# Patient Record
Sex: MALE | Race: WHITE | URBAN - METROPOLITAN AREA
[De-identification: names, ages, dates, MRNs, and addresses within clinical notes are randomized per-mention and may not be internally consistent; named-entity substitution may affect disease eponyms.]

---

## 2020-03-27 LAB — PSA, EXTERNAL: 0.2

## 2020-07-02 LAB
CREATININE, EXTERNAL: 0.97
HBA1C MFR BLD HPLC: 10.2 %
LDL-C, EXTERNAL: 52

## 2020-09-15 ENCOUNTER — DOCUMENTATION ONLY (OUTPATIENT)
Dept: ENDOCRINOLOGY | Age: 60
End: 2020-09-15

## 2020-10-07 LAB — MICROALBUMIN UR TEST STR-MCNC: 1.2 MG/DL

## 2020-11-25 ENCOUNTER — OFFICE VISIT (OUTPATIENT)
Dept: ENDOCRINOLOGY | Age: 60
End: 2020-11-25
Payer: COMMERCIAL

## 2020-11-25 ENCOUNTER — TELEPHONE (OUTPATIENT)
Dept: ENDOCRINOLOGY | Age: 60
End: 2020-11-25

## 2020-11-25 VITALS
HEART RATE: 70 BPM | WEIGHT: 257.4 LBS | OXYGEN SATURATION: 95 % | HEIGHT: 69 IN | DIASTOLIC BLOOD PRESSURE: 72 MMHG | TEMPERATURE: 96.7 F | BODY MASS INDEX: 38.12 KG/M2 | RESPIRATION RATE: 18 BRPM | SYSTOLIC BLOOD PRESSURE: 132 MMHG

## 2020-11-25 DIAGNOSIS — E11.65 TYPE 2 DIABETES MELLITUS WITH HYPERGLYCEMIA, WITHOUT LONG-TERM CURRENT USE OF INSULIN (HCC): Primary | ICD-10-CM

## 2020-11-25 DIAGNOSIS — G62.9 POLYNEUROPATHY, UNSPECIFIED: ICD-10-CM

## 2020-11-25 DIAGNOSIS — E78.2 MIXED HYPERLIPIDEMIA: ICD-10-CM

## 2020-11-25 DIAGNOSIS — I10 ESSENTIAL HYPERTENSION: ICD-10-CM

## 2020-11-25 PROCEDURE — 99244 OFF/OP CNSLTJ NEW/EST MOD 40: CPT | Performed by: INTERNAL MEDICINE

## 2020-11-25 RX ORDER — SILDENAFIL 50 MG/1
50 TABLET, FILM COATED ORAL AS NEEDED
COMMUNITY

## 2020-11-25 RX ORDER — MELATONIN 2.5MG/10ML
LIQUID (ML) ORAL
COMMUNITY

## 2020-11-25 RX ORDER — GABAPENTIN 600 MG/1
TABLET ORAL
COMMUNITY
Start: 2020-10-07

## 2020-11-25 RX ORDER — FLASH GLUCOSE SCANNING READER
EACH MISCELLANEOUS
COMMUNITY
Start: 2020-10-07 | End: 2022-05-04

## 2020-11-25 RX ORDER — PRAMIPEXOLE 0.38 MG/1
TABLET, EXTENDED RELEASE ORAL
COMMUNITY
Start: 2020-11-11

## 2020-11-25 RX ORDER — LISINOPRIL 2.5 MG/1
TABLET ORAL
COMMUNITY
Start: 2020-11-10

## 2020-11-25 RX ORDER — TRIAMCINOLONE ACETONIDE 1 MG/G
CREAM TOPICAL 2 TIMES DAILY
COMMUNITY

## 2020-11-25 RX ORDER — DULAGLUTIDE 0.75 MG/.5ML
0.75 INJECTION, SOLUTION SUBCUTANEOUS
Qty: 12 SYRINGE | Refills: 3 | Status: SHIPPED | OUTPATIENT
Start: 2020-11-25 | End: 2022-05-04

## 2020-11-25 RX ORDER — FLASH GLUCOSE SENSOR
KIT MISCELLANEOUS
COMMUNITY
Start: 2020-11-16 | End: 2022-05-04

## 2020-11-25 RX ORDER — DULOXETIN HYDROCHLORIDE 60 MG/1
CAPSULE, DELAYED RELEASE ORAL
COMMUNITY
Start: 2020-11-10

## 2020-11-25 RX ORDER — EMPAGLIFLOZIN AND LINAGLIPTIN 10; 5 MG/1; MG/1
TABLET, FILM COATED ORAL
COMMUNITY
Start: 2020-09-21 | End: 2022-05-04 | Stop reason: ALTCHOICE

## 2020-11-25 RX ORDER — MODAFINIL 200 MG/1
TABLET ORAL
COMMUNITY
Start: 2020-09-03

## 2020-11-25 RX ORDER — SIMVASTATIN 20 MG/1
TABLET, FILM COATED ORAL
COMMUNITY
Start: 2020-11-10

## 2020-11-25 RX ORDER — HYDROCHLOROTHIAZIDE 12.5 MG/1
TABLET ORAL
COMMUNITY
Start: 2020-10-05

## 2020-11-25 RX ORDER — METFORMIN HYDROCHLORIDE 1000 MG/1
TABLET ORAL
COMMUNITY
Start: 2020-11-10 | End: 2022-09-02 | Stop reason: SDUPTHER

## 2020-11-25 NOTE — PROGRESS NOTES
Room 1    Identified pt with two pt identifiers(name and ). Reviewed record in preparation for visit and have obtained necessary documentation. All patient medications has been reviewed. Chief Complaint   Patient presents with   [facility]    Diabetes       3 most recent PHQ Screens 2020   Little interest or pleasure in doing things Not at all   Feeling down, depressed, irritable, or hopeless Not at all   Total Score PHQ 2 0     Abuse Screening Questionnaire 2020   Do you ever feel afraid of your partner? N   Are you in a relationship with someone who physically or mentally threatens you? N   Is it safe for you to go home? Y       Health Maintenance Review: Patient reminded of \"due or due soon\" health maintenance. I have asked the patient to contact his/her primary care provider (PCP) for follow-up on his/her health maintenance. Vitals:    20 1556   BP: 132/72   Pulse: 70   Resp: 18   SpO2: 95%   Weight: 257 lb 6.4 oz (116.8 kg)   Height: 5' 9.41\" (1.763 m)   PainSc:   0 - No pain       Wt Readings from Last 3 Encounters:   20 257 lb 6.4 oz (116.8 kg)     Temp Readings from Last 3 Encounters:   No data found for Temp     BP Readings from Last 3 Encounters:   20 132/72     Pulse Readings from Last 3 Encounters:   20 70       No results found for: HBA1C, HGBE8, IMA8NUFR, KDU9GVIO, MCC8JTHA    Coordination of Care Questionnaire:   1) Have you been to an emergency room, urgent care, or hospitalized since your last visit?   no       2. Have seen or consulted any other health care provider since your last visit?  NO    Advance Care Planning:   End of Life Planning: has NO advanced directive - not interested in additional information, has NO advanced directive  - add't info provided, reviewed DNR/DNI and patient is not interested  Abel Rodriguez [address] ACP-Facilitator appointment no    Patient is accompanied by self I have received verbal consent from Alanna Spencer Angel to discuss any/all medical information while they are present in the room.

## 2020-11-25 NOTE — LETTER
11/28/20 Patient: Marianna Saldivar YOB: 1960 Date of Visit: 11/25/2020 Avila Tomlinson NP 
[address] [address] VIA Facsimile: [phone] Dear Avila Tomlinson NP, Thank you for referring Mr. Marianna Saldivar to [address] [place] [address] for evaluation. My notes for this consultation are attached. If you have questions, please do not hesitate to call me. I look forward to following your patient along with you. Sincerely, Shelly Miller MD

## 2020-11-25 NOTE — PROGRESS NOTES
Dedra Bentley MD          Patient Information  Date:11/28/2020  Name : Bonifacio Sánchez 56 y.o.     YOB: 1960         Referred by: Jaydon So NP         Chief Complaint   Patient presents with   [address] [place] [address]    Diabetes       History of Present Illness: Bonifacio Sánchez is a 56 y.o. male here for initial visit of  Diabetes Mellitus. Diabetes mellitus was diagnosed 2010 . End organ effects of diabetes: peripheral neuropathy.     Cardiovascular risk factors: dyslipidemia, diabetes mellitus   Monitoring frequency:2 /day   Last A1C was high and symptoms include  polyuria, polydipsia  Hypoglycemia: no  He is on Glyxambi and Metformin medication consistently  He is a  by profession, he is on light duty due to A1c of more than 8    Weight trend: decreasing steadily  Prior visit with dietician: yes -   Current exercise: no regular exercise    Wt Readings from Last 3 Encounters:   11/25/20 257 lb 6.4 oz (116.8 kg)       BP Readings from Last 3 Encounters:   11/25/20 132/72       No chest pain, shortness of breath, blurred vision  No history of pancreatitis    Past Medical History:   Diagnosis Date    Diabetes mellitus ([facility] [address]     Hyperlipidemia      Current Outpatient Medications   Medication Sig    DULoxetine (CYMBALTA) 60 mg capsule TAKE ONE CAPSULE BY MOUTH EVERY DAY    Glyxambi 10-5 mg tab TAKE ONE TABLET BY MOUTH EVERY MORNING    gabapentin (NEURONTIN) 600 mg tablet TAKE ONE TABLET BY MOUTH TWICE DAILY    hydroCHLOROthiazide (HYDRODIURIL) 12.5 mg tablet take 1 tablet by mouth once daily IN THE MORNING    lisinopriL (PRINIVIL, ZESTRIL) 2.5 mg tablet TAKE ONE TABLET BY MOUTH EVERY DAY    metFORMIN (GLUCOPHAGE) 1,000 mg tablet TAKE ONE TABLET BY MOUTH TWICE DAILY WITH MEALS    Pramipexole 0.375 mg Tb24 take 1 tablet by mouth 2 to 3 hours BEFORE BEDTIME    simvastatin (ZOCOR) 20 mg tablet TAKE ONE TABLET BY MOUTH EVERY EVENING    triamcinolone acetonide (KENALOG) 0.1 % topical cream Apply  to affected area two (2) times a day. use thin layer    sildenafil citrate (VIAGRA) 50 mg tablet Take 50 mg by mouth as needed for Erectile Dysfunction.  Omega-3-DHA-EPA-Fish Oil (Fish Oil) 1,200 (144-216) mg cap Take  by mouth.  FreeStyle Blair 14 Day Elliott misc USE TO check THREE TIMES DAILY AS DIRECTED    FreeStyle Blair 14 Day Sensor kit USE AS DIRECTED    modafiniL (PROVIGIL) 200 mg tablet TAKE 1/2 TABLET BY MOUTH twice daily (no LATER THAN 3pm)    dulaglutide (Trulicity) 0.37 SD/6.8 mL sub-q pen 0.5 mL by SubCUTAneous route every seven (7) days. No current facility-administered medications for this visit. No Known Allergies    Review of Systems:  All 10 systems reviewed and are negative other than mentioned in HPI    Physical Examination:   Blood pressure 132/72, pulse 70, temperature (!) 96.7 °F (35.9 °C), temperature source Oral, resp. rate 18, height 5' 9.41\" (1.763 m), weight 257 lb 6.4 oz (116.8 kg), SpO2 95 %. Estimated body mass index is 37.56 kg/m² as calculated from the following:    Height as of this encounter: 5' 9.41\" (1.763 m). -   Weight as of this encounter: 257 lb 6.4 oz (116.8 kg).   - General: pleasant, no distress, good eye contact  - HEENT: no pallor, no periorbital edema, EOMI  - Neck: supple, no thyromegaly, no nodules  - Cardiovascular: regular,  normal S1 and S2  - Respiratory: clear to auscultation bilaterally  - Gastrointestinal: soft, nontender, nondistended,  BS +  - Musculoskeletal: no proximal muscle weakness in upper or lower extremities  - Integumentary: No edema  - Neurological: alert and oriented  - Psychiatric: normal mood and affect  - Skin: color, texture, turgor normal.           Data Reviewed:       No results found for: HBA1C, RAV4AGAS, HGBE8, GLU, GESTF, GLUCPOC, MCACR, MCA1, MCA2, MCA3, MCAU, LDL, LDLC, DLDLP, HOLLIE, CREAPOC, ACREA, CREA, REFC3, REFC4, CNS6VOFO, VKM6KUEZ Assessment/Plan:     1. Type 2 diabetes mellitus with hyperglycemia, without long-term current use of insulin ([facility] [address]    2. Essential hypertension    3. Polyneuropathy, unspecified    4. Mixed hyperlipidemia        1. Type 2 Diabetes Mellitus   No results found for: HBA1C, HGBE8, KUS7KFZH, DWV6JLDZ, OXE3UNIU  Metformin, Trulicity,: Discussed the side effects  Once he tolerates Trulicity well will discontinue Glyxambi which is a combination of DPP 4 and SGLT2 inhibitors as combination GLP-1 agonist and DPP 4 inhibitor will not help, and switch him to plain Jardiance    Diabetic issues reviewed : glycemic goals , written exchange diet given, low carbohydrate diet, weight control , home glucose monitoring emphasized,  hypoglycemia management and long term diabetic complications discussed. FLU annually ,Pneumovax ,aspirin daily,annual eye exam,microalbumin    2. HTN : Continue current therapy     3. Hyperlipidemia : Will benefit from statin    4. Obesity:Body mass index is 37.56 kg/m². Discussed about the importance of exercise and carbohydrate portion control. Patient Instructions   Once Trulicity is well tolerated then we will switch Glyxambi to Jardiance 25 mg in AM     Follow-up and Dispositions    · Return in about 3 months (around 2/25/2021) for labs before next visit and follow up. Thank you for allowing me to participate in the care of this patient. Christoph Johnson MD      Patient verbalized understanding     Voice-recognition software was used to generate this report, which may result in some phonetic-based errors in the grammar and contents. Even though attempts were made to correct all the mistakes, some may have been missed and remained in the body of the report.

## 2020-11-25 NOTE — TELEPHONE ENCOUNTER
Patient stated if any medications he will be prescribed are scheduled drugs please send to alec villalobos at [facility] as they cannot fill those [facility] right now

## 2020-12-14 ENCOUNTER — TELEPHONE (OUTPATIENT)
Dept: ENDOCRINOLOGY | Age: 60
End: 2020-12-14

## 2020-12-14 NOTE — TELEPHONE ENCOUNTER
Informed pt of message per physician:    Limited graph as he is checking one daily, no evening data. Asked pt to check BG before meals and at bedtime.

## 2021-01-11 LAB
CREATININE, EXTERNAL: 0.78
HBA1C MFR BLD HPLC: 7.8 %
LDL-C, EXTERNAL: 77

## 2021-02-19 PROBLEM — E11.65 TYPE 2 DIABETES MELLITUS WITH HYPERGLYCEMIA, WITHOUT LONG-TERM CURRENT USE OF INSULIN (HCC): Status: ACTIVE | Noted: 2021-02-19

## 2022-03-18 PROBLEM — E11.65 TYPE 2 DIABETES MELLITUS WITH HYPERGLYCEMIA, WITHOUT LONG-TERM CURRENT USE OF INSULIN (HCC): Status: ACTIVE | Noted: 2021-02-19

## 2022-05-04 ENCOUNTER — OFFICE VISIT (OUTPATIENT)
Dept: ENDOCRINOLOGY | Age: 62
End: 2022-05-04
Payer: COMMERCIAL

## 2022-05-04 VITALS
HEIGHT: 69 IN | DIASTOLIC BLOOD PRESSURE: 65 MMHG | SYSTOLIC BLOOD PRESSURE: 114 MMHG | OXYGEN SATURATION: 97 % | WEIGHT: 244 LBS | RESPIRATION RATE: 20 BRPM | TEMPERATURE: 97.7 F | BODY MASS INDEX: 36.14 KG/M2 | HEART RATE: 81 BPM

## 2022-05-04 DIAGNOSIS — I10 ESSENTIAL HYPERTENSION: ICD-10-CM

## 2022-05-04 DIAGNOSIS — E11.65 TYPE 2 DIABETES MELLITUS WITH HYPERGLYCEMIA, WITHOUT LONG-TERM CURRENT USE OF INSULIN (HCC): Primary | ICD-10-CM

## 2022-05-04 DIAGNOSIS — G62.9 POLYNEUROPATHY, UNSPECIFIED: ICD-10-CM

## 2022-05-04 PROCEDURE — 99215 OFFICE O/P EST HI 40 MIN: CPT | Performed by: INTERNAL MEDICINE

## 2022-05-04 RX ORDER — DULAGLUTIDE 1.5 MG/.5ML
1.5 INJECTION, SOLUTION SUBCUTANEOUS
Qty: 12 EACH | Refills: 3 | Status: SHIPPED | OUTPATIENT
Start: 2022-05-04 | End: 2022-09-02 | Stop reason: SDUPTHER

## 2022-05-04 RX ORDER — DULAGLUTIDE 1.5 MG/.5ML
1.5 INJECTION, SOLUTION SUBCUTANEOUS
Qty: 12 EACH | Refills: 3 | Status: SHIPPED | OUTPATIENT
Start: 2022-05-04 | End: 2022-05-04 | Stop reason: SDUPTHER

## 2022-05-04 NOTE — PROGRESS NOTES
Willy Pablo is a 57 y.o. male here for   Chief Complaint   Patient presents with    Diabetes     seen once in 2020       1. Have you been to the ER, urgent care clinic since your last visit? Hospitalized since your last visit? -[facility] Covarrubias in March for COVID    2. Have you seen or consulted any other health care providers outside of the [address] [place] [address] since your last visit? Include any pap smears or colon screening. -PCP and Dr. Liseth Joaquin

## 2022-05-04 NOTE — LETTER
5/4/2022    Patient: Osman Crews   YOB: 1960   Date of Visit: Leandro Gibbons MD  [address] [facility] [address]  Via Fax: [phone]    Dear Otoniel Hester MD,      Thank you for referring Mr. Osman Crews to [address] [place] [address] for evaluation. My notes for this consultation are attached. If you have questions, please do not hesitate to call me. I look forward to following your patient along with you.       Sincerely,    Megan Kasper MD

## 2022-09-02 ENCOUNTER — OFFICE VISIT (OUTPATIENT)
Dept: ENDOCRINOLOGY | Age: 62
End: 2022-09-02
Payer: COMMERCIAL

## 2022-09-02 VITALS
RESPIRATION RATE: 20 BRPM | HEART RATE: 81 BPM | BODY MASS INDEX: 34.96 KG/M2 | WEIGHT: 236 LBS | SYSTOLIC BLOOD PRESSURE: 138 MMHG | TEMPERATURE: 97.8 F | DIASTOLIC BLOOD PRESSURE: 78 MMHG | HEIGHT: 69 IN | OXYGEN SATURATION: 98 %

## 2022-09-02 DIAGNOSIS — I10 ESSENTIAL HYPERTENSION: ICD-10-CM

## 2022-09-02 DIAGNOSIS — E78.2 MIXED HYPERLIPIDEMIA: ICD-10-CM

## 2022-09-02 DIAGNOSIS — E11.65 TYPE 2 DIABETES MELLITUS WITH HYPERGLYCEMIA, WITHOUT LONG-TERM CURRENT USE OF INSULIN (HCC): Primary | ICD-10-CM

## 2022-09-02 PROCEDURE — 99214 OFFICE O/P EST MOD 30 MIN: CPT | Performed by: INTERNAL MEDICINE

## 2022-09-02 RX ORDER — DULAGLUTIDE 1.5 MG/.5ML
1.5 INJECTION, SOLUTION SUBCUTANEOUS
Qty: 12 EACH | Refills: 3 | Status: SHIPPED | OUTPATIENT
Start: 2022-09-02

## 2022-09-02 RX ORDER — PIOGLITAZONEHYDROCHLORIDE 30 MG/1
TABLET ORAL
Qty: 30 TABLET | Refills: 3 | Status: SHIPPED | OUTPATIENT
Start: 2022-09-02

## 2022-09-02 RX ORDER — FLASH GLUCOSE SENSOR
KIT MISCELLANEOUS
Qty: 2 KIT | Refills: 11 | Status: SHIPPED | OUTPATIENT
Start: 2022-09-02

## 2022-09-02 RX ORDER — METFORMIN HYDROCHLORIDE 1000 MG/1
TABLET ORAL
Qty: 180 TABLET | Refills: 4 | Status: SHIPPED | OUTPATIENT
Start: 2022-09-02

## 2022-09-02 RX ORDER — MELOXICAM 15 MG/1
15 TABLET ORAL DAILY
COMMUNITY

## 2022-09-02 RX ORDER — METFORMIN HYDROCHLORIDE 1000 MG/1
TABLET ORAL
Qty: 180 TABLET | Refills: 4 | Status: SHIPPED | OUTPATIENT
Start: 2022-09-02 | End: 2022-09-02 | Stop reason: SDUPTHER

## 2022-09-02 NOTE — PROGRESS NOTES
Omer Shane MD          Patient Information  Date:9/3/2022  Name : Darien Rangel 57 y.o.     YOB: 1960         Referred by: Diana Cordova MD         Chief Complaint   Patient presents with    Diabetes       History of Present Illness: Darien Rangel is a 57 y.o. male here for follow-up of type 2 diabetes mellitus    Diabetes mellitus was diagnosed 2010 . End organ effects of diabetes: peripheral neuropathy. Cardiovascular risk factors: dyslipidemia, diabetes mellitus   He could not get freestyle blair, not checking the blood glucose  Diet is high in carbohydrates,  A1c worsened   No meter or log         Prior hx   He is on Glyxambi and Metformin medication consistently  He is a  by profession, he is on light duty due to A1c of more than 8    Weight trend: decreasing steadily  Prior visit with dietician: yes -   Current exercise: no regular exercise    Wt Readings from Last 3 Encounters:   09/02/22 236 lb (107 kg)   05/04/22 244 lb (110.7 kg)   11/25/20 257 lb 6.4 oz (116.8 kg)       BP Readings from Last 3 Encounters:   09/02/22 138/78   05/04/22 114/65   11/25/20 132/72       No chest pain, shortness of breath, blurred vision  No history of pancreatitis    Past Medical History:   Diagnosis Date    Diabetes mellitus ([address]     Hyperlipidemia      Current Outpatient Medications   Medication Sig    dulaglutide (Trulicity) 1.5 HO/6.4 mL sub-q pen 0.5 mL by SubCUTAneous route every seven (7) days. empagliflozin (Jardiance) 25 mg tablet Take 1 Tablet by mouth every morning. Stop glyxambi    pioglitazone (ACTOS) 30 mg tablet 1 tablet in AM    metFORMIN (GLUCOPHAGE) 1,000 mg tablet TAKE ONE TABLET BY MOUTH TWICE DAILY WITH MEALS    flash glucose sensor (FreeStyle Blair 2 Sensor) kit Use to check BG as directed. Dx code E11.65    meloxicam (MOBIC) 15 mg tablet Take 15 mg by mouth daily.     multivit-min/FA/lycopen/lutein (CENTRUM SILVER ULTRA MEN'S PO) Take 1 Tablet by mouth daily. DULoxetine (CYMBALTA) 60 mg capsule TAKE ONE CAPSULE BY MOUTH EVERY DAY    gabapentin (NEURONTIN) 600 mg tablet TAKE ONE TABLET BY MOUTH TWICE DAILY    hydroCHLOROthiazide (HYDRODIURIL) 12.5 mg tablet take 1 tablet by mouth once daily IN THE MORNING    lisinopriL (PRINIVIL, ZESTRIL) 2.5 mg tablet TAKE ONE TABLET BY MOUTH EVERY DAY    modafiniL (PROVIGIL) 200 mg tablet TAKE 1/2 TABLET BY MOUTH twice daily (no LATER THAN 3pm)    Pramipexole 0.375 mg Tb24 take 1 tablet by mouth 2 to 3 hours BEFORE BEDTIME    simvastatin (ZOCOR) 20 mg tablet TAKE ONE TABLET BY MOUTH EVERY EVENING    triamcinolone acetonide (KENALOG) 0.1 % topical cream Apply  to affected area two (2) times a day. use thin layer    sildenafil citrate (VIAGRA) 50 mg tablet Take 50 mg by mouth as needed for Erectile Dysfunction. Omega-3-DHA-EPA-Fish Oil 1,200 (144-216) mg cap Take  by mouth. No current facility-administered medications for this visit. No Known Allergies    Review of Systems: Per HPI    Physical Examination:   Blood pressure 138/78, pulse 81, temperature 97.8 °F (36.6 °C), temperature source Temporal, resp. rate 20, height 5' 9.41\" (1.763 m), weight 236 lb (107 kg), SpO2 98 %. Estimated body mass index is 34.44 kg/m² as calculated from the following:    Height as of this encounter: 5' 9.41\" (1.763 m). Weight as of this encounter: 236 lb (107 kg).   General: pleasant, no distress, good eye contact  HEENT: no pallor, no periorbital edema, EOMI  Neck: supple, no thyromegaly, no nodules  Cardiovascular: regular,  normal S1 and S2  Respiratory: clear to auscultation bilaterally  Musculoskeletal: no proximal muscle weakness in upper or lower extremities  Integumentary: No edema  Neurological: alert and oriented  Psychiatric: normal mood and affect  Skin: color, texture, turgor normal.     2/22 - creatinine 0.76 A1C 9.4       Data Reviewed:       Lab Results   Component Value Date/Time    Hemoglobin A1c, External 7.8 01/11/2021 12:00 AM    Hemoglobin A1c, External 10.2 07/02/2020 12:00 AM          Assessment/Plan:     1. Type 2 diabetes mellitus with hyperglycemia, without long-term current use of insulin ([facility] [address]    2. Mixed hyperlipidemia    3. Essential hypertension          1. Type 2 Diabetes Mellitus   Lab Results   Component Value Date/Time    Hemoglobin A1c, External 7.8 01/11/2021 12:00 AM   A1C 9.4 2/2022, August 2022 A1c 11  Reviewed labs from PCP  Poorly controlled diabetes mellitus,  Metformin, Trulicity increase the dose,: Discussed the side effects  Jardiance, Actos   Dietary compliance discussed  Diabetic issues reviewed : glycemic goals , written exchange diet given, low carbohydrate diet, weight control , home glucose monitoring emphasized,  hypoglycemia management and long term diabetic complications discussed. FLU annually ,Pneumovax ,aspirin daily,annual eye exam,microalbumin    2. HTN : Continue current therapy     3. Hyperlipidemia : Will benefit from statin    4. Obesity:Body mass index is 34.44 kg/m². Discussed about the importance of exercise and carbohydrate portion control. There are no Patient Instructions on file for this visit. Follow-up and Dispositions    Return in about 4 months (around 1/2/2023) for labs before next visit and follow up. Thank you for allowing me to participate in the care of this patient. Ham Lock MD      Patient verbalized understanding     Voice-recognition software was used to generate this report, which may result in some phonetic-based errors in the grammar and contents. Even though attempts were made to correct all the mistakes, some may have been missed and remained in the body of the report.

## 2022-09-02 NOTE — PROGRESS NOTES
Emily Glover is a 57 y.o. male here for   Chief Complaint   Patient presents with    Diabetes       1. Have you been to the ER, urgent care clinic since your last visit? Hospitalized since your last visit? -8/10/22 [facility] for GI blockage    2. Have you seen or consulted any other health care providers outside of the [address] [place] [address] since your last visit? Include any pap smears or colon screening. -PCP

## 2022-09-02 NOTE — LETTER
9/3/2022    Patient: Marino Ragsdale   YOB: 1960   Date of Visit: 9/2/2022     Madi Goff MD  [address] [facility] [address]  Via Fax: [phone]    Dear Madi Goff MD,      Thank you for referring Mr. Marino Ragsdale to [address] [place] [address] for evaluation. My notes for this consultation are attached. If you have questions, please do not hesitate to call me. I look forward to following your patient along with you.       Sincerely,    Alona Toney MD

## 2022-11-03 ENCOUNTER — TRANSCRIBE ORDER (OUTPATIENT)
Dept: SCHEDULING | Age: 62
End: 2022-11-03

## 2022-11-03 DIAGNOSIS — K26.7 CHRONIC DUODENAL ULCER WITHOUT HEMORRHAGE OR PERFORATION: Primary | ICD-10-CM

## 2022-11-03 DIAGNOSIS — R16.1 SPLENOMEGALY: ICD-10-CM

## 2022-11-03 DIAGNOSIS — K31.5 DUODENAL OBSTRUCTION: ICD-10-CM

## 2022-11-03 DIAGNOSIS — R10.11 ABDOMINAL PAIN, RIGHT UPPER QUADRANT: ICD-10-CM

## 2022-11-21 ENCOUNTER — TRANSCRIBE ORDER (OUTPATIENT)
Dept: SCHEDULING | Age: 62
End: 2022-11-21

## 2022-11-21 DIAGNOSIS — K57.92 DIVERTICULITIS: ICD-10-CM

## 2022-11-21 DIAGNOSIS — K63.5 POLYP OF COLON: ICD-10-CM

## 2022-11-21 DIAGNOSIS — R19.4 CHANGE IN BOWEL HABIT: Primary | ICD-10-CM

## 2022-11-21 DIAGNOSIS — R16.1 SPLENOMEGALY: ICD-10-CM

## 2022-11-21 DIAGNOSIS — R10.10 UPPER ABDOMINAL PAIN: ICD-10-CM

## 2022-11-21 DIAGNOSIS — K76.0 NON-ALCOHOLIC FATTY LIVER DISEASE: ICD-10-CM

## 2022-11-21 DIAGNOSIS — E11.9 TYPE 2 DIABETES MELLITUS WITHOUT COMPLICATIONS (HCC): ICD-10-CM

## 2022-11-21 DIAGNOSIS — E66.9 OBESITY: ICD-10-CM

## 2022-11-21 DIAGNOSIS — R19.7 DIARRHEA: ICD-10-CM

## 2022-11-21 DIAGNOSIS — K59.00 CONSTIPATION, UNSPECIFIED: ICD-10-CM

## 2022-11-21 DIAGNOSIS — R10.13 DYSPEPSIA: ICD-10-CM

## 2022-11-21 DIAGNOSIS — K31.5 DUODENAL OBSTRUCTION: ICD-10-CM

## 2022-11-21 DIAGNOSIS — K59.00 CONSTIPATION: ICD-10-CM

## 2022-11-21 DIAGNOSIS — K26.7 CHRONIC DUODENAL ULCER WITHOUT HEMORRHAGE OR PERFORATION: ICD-10-CM

## 2022-11-21 DIAGNOSIS — K31.89 OTHER DISEASES OF STOMACH AND DUODENUM: ICD-10-CM

## 2022-11-21 DIAGNOSIS — K29.00 GASTRITIS, ACUTE: ICD-10-CM

## 2023-02-15 LAB — CREATININE, EXTERNAL: 0.99

## 2023-03-28 LAB — CREATININE, EXTERNAL: 1.01

## 2023-05-01 ENCOUNTER — OFFICE VISIT (OUTPATIENT)
Dept: ENDOCRINOLOGY | Age: 63
End: 2023-05-01
Payer: COMMERCIAL

## 2023-05-01 VITALS
HEIGHT: 69 IN | RESPIRATION RATE: 18 BRPM | HEART RATE: 59 BPM | DIASTOLIC BLOOD PRESSURE: 73 MMHG | TEMPERATURE: 97.8 F | BODY MASS INDEX: 33.12 KG/M2 | WEIGHT: 223.6 LBS | SYSTOLIC BLOOD PRESSURE: 128 MMHG | OXYGEN SATURATION: 97 %

## 2023-05-01 DIAGNOSIS — I10 ESSENTIAL HYPERTENSION: ICD-10-CM

## 2023-05-01 DIAGNOSIS — E78.2 MIXED HYPERLIPIDEMIA: ICD-10-CM

## 2023-05-01 DIAGNOSIS — E11.65 TYPE 2 DIABETES MELLITUS WITH HYPERGLYCEMIA, WITHOUT LONG-TERM CURRENT USE OF INSULIN (HCC): Primary | ICD-10-CM

## 2023-05-01 PROCEDURE — 3074F SYST BP LT 130 MM HG: CPT | Performed by: INTERNAL MEDICINE

## 2023-05-01 PROCEDURE — 1123F ACP DISCUSS/DSCN MKR DOCD: CPT | Performed by: INTERNAL MEDICINE

## 2023-05-01 PROCEDURE — 99214 OFFICE O/P EST MOD 30 MIN: CPT | Performed by: INTERNAL MEDICINE

## 2023-05-01 PROCEDURE — 3078F DIAST BP <80 MM HG: CPT | Performed by: INTERNAL MEDICINE

## 2023-05-01 RX ORDER — PANTOPRAZOLE SODIUM 40 MG/1
40 TABLET, DELAYED RELEASE ORAL DAILY
COMMUNITY

## 2023-05-01 RX ORDER — ATORVASTATIN CALCIUM 40 MG/1
TABLET, FILM COATED ORAL DAILY
COMMUNITY

## 2023-05-01 RX ORDER — DULAGLUTIDE 3 MG/.5ML
3 INJECTION, SOLUTION SUBCUTANEOUS
Qty: 12 EACH | Refills: 3 | Status: SHIPPED | OUTPATIENT
Start: 2023-05-01

## 2023-05-01 NOTE — PROGRESS NOTES
Vandana Perez MD          Patient Information  Date:5/1/2023  Name : Laurey Mcardle 70 y.o.     YOB: 1960         Referred by: Adi Hart MD     Chief Complaint   Patient presents with    Diabetes     History of Present Illness: Laurey Mcardle is a 70 y.o. male here for follow-up of type 2 diabetes mellitus. [birth year]  Trulicity - tolerating  Urine - yellow  Having dry mouth  Not on Actos  Eye exam - up to date, no issues  Had ERCP for cholelithiasis, when he presented with abdominal pain, was found to have duodenal ulcer and cholelithiasis, followed by GI,  Having consultation for possible cystectomy    Prior history    Diabetes mellitus was diagnosed 2010 . End organ effects of diabetes: peripheral neuropathy. Cardiovascular risk factors: dyslipidemia, diabetes mellitus   He could not get freestyle baltazar, not checking the blood glucose  Diet is high in carbohydrates,  A1c worsened  No meter or log     Prior hx   He is on Glyxambi and Metformin medication consistently  He is a  by profession, he is on light duty due to A1c of more than 8    Weight trend: decreasing steadily  Prior visit with dietician: yes -   Current exercise: no regular exercise    Wt Readings from Last 3 Encounters:   05/01/23 223 lb 9.6 oz (101.4 kg)   09/02/22 236 lb (107 kg)   05/04/22 244 lb (110.7 kg)       BP Readings from Last 3 Encounters:   05/01/23 128/73   09/02/22 138/78   05/04/22 114/65       No chest pain, shortness of breath, blurred vision  No history of pancreatitis    Past Medical History:   Diagnosis Date    Diabetes mellitus ([facility] [address]     Hyperlipidemia      Current Outpatient Medications   Medication Sig    atorvastatin (LIPITOR) 40 mg tablet Take  by mouth daily. pantoprazole (PROTONIX) 40 mg tablet Take 1 Tablet by mouth daily. dulaglutide (Trulicity) 3 SB/2.6 mL pnij 3 mg by SubCUTAneous route every seven (7) days.  Stop 1.5 mg empagliflozin (Jardiance) 25 mg tablet Take 1 Tablet by mouth every morning. Stop glyxambi    metFORMIN (GLUCOPHAGE) 1,000 mg tablet TAKE ONE TABLET BY MOUTH TWICE DAILY WITH MEALS    multivit-min/FA/lycopen/lutein (CENTRUM SILVER ULTRA MEN'S PO) Take 1 Tablet by mouth daily. DULoxetine (CYMBALTA) 60 mg capsule TAKE ONE CAPSULE BY MOUTH EVERY DAY    gabapentin (NEURONTIN) 600 mg tablet TAKE ONE TABLET BY MOUTH TWICE DAILY    hydroCHLOROthiazide (HYDRODIURIL) 12.5 mg tablet take 1 tablet by mouth once daily IN THE MORNING    lisinopriL (PRINIVIL, ZESTRIL) 2.5 mg tablet TAKE ONE TABLET BY MOUTH EVERY DAY    Pramipexole 0.375 mg Tb24 take 1 tablet by mouth 2 to 3 hours BEFORE BEDTIME    Omega-3-DHA-EPA-Fish Oil 1,200 (144-216) mg cap Take  by mouth.    pioglitazone (ACTOS) 30 mg tablet 1 tablet in AM (Patient not taking: Reported on 5/1/2023)    flash glucose sensor (FreeStyle Blair 2 Sensor) kit Use to check BG as directed. Dx code E11.65 (Patient not taking: Reported on 5/1/2023)    meloxicam (MOBIC) 15 mg tablet Take 15 mg by mouth daily. (Patient not taking: Reported on 5/1/2023)    modafiniL (PROVIGIL) 200 mg tablet TAKE 1/2 TABLET BY MOUTH twice daily (no LATER THAN 3pm) (Patient not taking: Reported on 5/1/2023)    simvastatin (ZOCOR) 20 mg tablet TAKE ONE TABLET BY MOUTH EVERY EVENING (Patient not taking: Reported on 5/1/2023)    triamcinolone acetonide (KENALOG) 0.1 % topical cream Apply  to affected area two (2) times a day. use thin layer (Patient not taking: Reported on 5/1/2023)    sildenafil citrate (VIAGRA) 50 mg tablet Take 50 mg by mouth as needed for Erectile Dysfunction. (Patient not taking: Reported on 5/1/2023)     No current facility-administered medications for this visit. No Known Allergies    Review of Systems: Per HPI    Physical Examination:   Blood pressure 128/73, pulse (!) 59, temperature 97.8 °F (36.6 °C), temperature source Temporal, resp.  rate 18, height 5' 9\" (1.753 m), weight 223 lb 9.6 oz (101.4 kg), SpO2 97 %. Estimated body mass index is 33.02 kg/m² as calculated from the following:    Height as of this encounter: 5' 9\" (1.753 m). Weight as of this encounter: 223 lb 9.6 oz (101.4 kg). General: pleasant, no distress, good eye contact  HEENT: no pallor, no periorbital edema, EOMI  Neck: supple, no thyromegaly, no nodules  Cardiovascular: regular,  normal S1 and S2  Respiratory: clear to auscultation bilaterally  Musculoskeletal: no proximal muscle weakness in upper or lower extremities  Integumentary: No edema  Neurological: alert and oriented  Psychiatric: normal mood and affect  Skin: color, texture, turgor normal.     2/22 - creatinine 0.76 A1C 9.4       Data Reviewed:       Lab Results   Component Value Date/Time    Hemoglobin A1c, External 10.9 08/19/2022 12:00 AM    Hemoglobin A1c, External 7.8 01/11/2021 12:00 AM    Hemoglobin A1c, External 10.2 07/02/2020 12:00 AM          Assessment/Plan:     1. Type 2 diabetes mellitus with hyperglycemia, without long-term current use of insulin ([facility] [address]    2. Mixed hyperlipidemia    3. Essential hypertension            1. Type 2 Diabetes Mellitus   Lab Results   Component Value Date/Time    Hemoglobin A1c, External 10.9 08/19/2022 12:00 AM     Reviewed labs from hematology, A1c February 2023 7.2, April 2023 hemoglobin 14  Improved control  Metformin, Trulicity increase the dose,: Discussed the side effects  Jardiance, he has discontinued Actos   Dietary compliance discussed  Diabetic issues reviewed : glycemic goals , written exchange diet given, low carbohydrate diet, weight control , home glucose monitoring emphasized,  hypoglycemia management and long term diabetic complications discussed. FLU annually ,Pneumovax ,aspirin daily,annual eye exam,microalbumin    2. HTN : Continue current therapy     3. Hyperlipidemia : Will benefit from statin    4. Obesity:Body mass index is 33.02 kg/m².   Discussed about the importance of exercise and carbohydrate portion control. 5.  Cholelithiasis/duodenal ulcer diagnosed in 2023    There are no Patient Instructions on file for this visit. Follow-up and Dispositions    Return in about 4 months (around 9/1/2023) for labs before next visit and follow up. Thank you for allowing me to participate in the care of this patient. Nanette Hsu MD      Patient verbalized understanding     Voice-recognition software was used to generate this report, which may result in some phonetic-based errors in the grammar and contents. Even though attempts were made to correct all the mistakes, some may have been missed and remained in the body of the report.

## 2023-05-01 NOTE — PROGRESS NOTES
Chucho Rubio is a 70 y.o. male here for   Chief Complaint   Patient presents with    Diabetes       1. Have you been to the ER or an urgent care clinic since your last visit?  - no    2. Have you been hospitalized since your last visit? - stent placements in Gallbladder - Everardo Sanchez; Kidney stone removal - Everardo Sanchez     3. Have you seen or consulted any other health care providers outside of the [address] [place] [address] since your last visit?   Include any pap smears or colon screening.- Outpatient - Endoscopy and Colonoscopy (separate occassions) - [facility]

## 2023-05-08 DIAGNOSIS — E11.65 TYPE 2 DIABETES MELLITUS WITH HYPERGLYCEMIA, WITHOUT LONG-TERM CURRENT USE OF INSULIN (HCC): Primary | ICD-10-CM

## 2023-05-08 DIAGNOSIS — E78.2 MIXED HYPERLIPIDEMIA: ICD-10-CM

## 2023-10-04 RX ORDER — EMPAGLIFLOZIN 25 MG/1
TABLET, FILM COATED ORAL
Qty: 90 TABLET | Refills: 0 | Status: SHIPPED | OUTPATIENT
Start: 2023-10-04

## 2023-10-25 DIAGNOSIS — E11.65 TYPE 2 DIABETES MELLITUS WITH HYPERGLYCEMIA, WITHOUT LONG-TERM CURRENT USE OF INSULIN (HCC): Primary | ICD-10-CM

## 2024-01-03 DIAGNOSIS — E11.65 TYPE 2 DIABETES MELLITUS WITH HYPERGLYCEMIA, WITHOUT LONG-TERM CURRENT USE OF INSULIN (HCC): Primary | ICD-10-CM

## 2024-03-28 DIAGNOSIS — E11.65 TYPE 2 DIABETES MELLITUS WITH HYPERGLYCEMIA, WITHOUT LONG-TERM CURRENT USE OF INSULIN (HCC): Primary | ICD-10-CM

## 2024-03-29 RX ORDER — DULAGLUTIDE 1.5 MG/.5ML
1.5 INJECTION, SOLUTION SUBCUTANEOUS
Qty: 6 ML | Refills: 0 | Status: SHIPPED | OUTPATIENT
Start: 2024-03-29

## 2024-04-16 DIAGNOSIS — E11.65 TYPE 2 DIABETES MELLITUS WITH HYPERGLYCEMIA, WITHOUT LONG-TERM CURRENT USE OF INSULIN (HCC): Primary | ICD-10-CM

## 2024-04-27 DIAGNOSIS — E11.65 TYPE 2 DIABETES MELLITUS WITH HYPERGLYCEMIA, WITHOUT LONG-TERM CURRENT USE OF INSULIN (HCC): ICD-10-CM

## 2024-06-14 ENCOUNTER — OFFICE VISIT (OUTPATIENT)
Age: 64
End: 2024-06-14
Payer: COMMERCIAL

## 2024-06-14 VITALS
SYSTOLIC BLOOD PRESSURE: 114 MMHG | HEIGHT: 69 IN | TEMPERATURE: 97.7 F | BODY MASS INDEX: 32.94 KG/M2 | HEART RATE: 61 BPM | OXYGEN SATURATION: 98 % | DIASTOLIC BLOOD PRESSURE: 74 MMHG | WEIGHT: 222.4 LBS

## 2024-06-14 DIAGNOSIS — E78.2 MIXED HYPERLIPIDEMIA: ICD-10-CM

## 2024-06-14 DIAGNOSIS — E11.65 TYPE 2 DIABETES MELLITUS WITH HYPERGLYCEMIA, WITHOUT LONG-TERM CURRENT USE OF INSULIN (HCC): Primary | ICD-10-CM

## 2024-06-14 PROCEDURE — 99214 OFFICE O/P EST MOD 30 MIN: CPT | Performed by: INTERNAL MEDICINE

## 2024-06-14 PROCEDURE — 1123F ACP DISCUSS/DSCN MKR DOCD: CPT | Performed by: INTERNAL MEDICINE

## 2024-06-14 RX ORDER — BLOOD-GLUCOSE SENSOR
EACH MISCELLANEOUS
Qty: 6 EACH | Refills: 3 | Status: SHIPPED | OUTPATIENT
Start: 2024-06-14

## 2024-06-14 RX ORDER — PIOGLITAZONEHYDROCHLORIDE 30 MG/1
30 TABLET ORAL DAILY
Qty: 30 TABLET | Refills: 3 | Status: SHIPPED | OUTPATIENT
Start: 2024-06-14

## 2024-06-14 RX ORDER — AMOXICILLIN AND CLAVULANATE POTASSIUM 875; 125 MG/1; MG/1
1 TABLET, FILM COATED ORAL 2 TIMES DAILY
COMMUNITY
Start: 2024-06-02

## 2024-06-14 RX ORDER — DULAGLUTIDE 1.5 MG/.5ML
1.5 INJECTION, SOLUTION SUBCUTANEOUS
Qty: 6 ML | Refills: 2 | Status: SHIPPED | OUTPATIENT
Start: 2024-06-14

## 2024-06-14 NOTE — PATIENT INSTRUCTIONS
Metformin     Jardiance AM     Pioglitazone AM     Trulicity ( not an insulin  )  weekly   or  insulin Lantus 15 units at night     Blood glucose goals    Fasting or before meals less than 120,  2 hours after meals or at bedtime less than 180.If the bedtime sugars are less than 100 ,eat a 15 gm snack.    Low blood glucose is less than 70, if you are using continuous glucose monitor please crosscheck with fingerstick as continuous glucose monitor is not accurate when glucose is less than 70.    4 glucose tablets or half a cup of juice for sugars <70. Check blood sugar 15 minutes later, may repeat juice or tablets in 15 minutes.     Glucagon use by family member for severe hypoglycemia (unconciousness)

## 2024-06-14 NOTE — PROGRESS NOTES
Reviewed:           No results found for: \"GFR\", \"GFRAA\"     No results found for: \"LABA1C\"      No results found for: \"TRIG\", \"HDL\", \"NA\", \"K\", \"CL\", \"CO2\", \"BUN\", \"CREATININE\", \"GFRAA\", \"LABGLOM\", \"GLUCOSE\", \"CALCIUM\", \"MALBCR\"       August 2023, BMP normal              Assessment/Plan:                 1. Type 2 Diabetes Mellitus      Follow-up after a year, labs today   Metformin, Trulicity:   Jardiance,Actos    Dietary compliance discussed   Diabetic issues reviewed : glycemic goals , written exchange diet given, low carbohydrate diet, weight control , home glucose monitoring emphasized,  hypoglycemia management and long term diabetic complications discussed.    FLU annually ,Pneumovax ,aspirin daily,annual eye exam,microalbumin      2.  HTN : Continue current  therapy       3. Hyperlipidemia : Will benefit from statin      4.Obesity:   Discussed about the importance of exercise and carbohydrate portion control.      5.  Cholelithiasis/duodenal ulcer diagnosed in 2023      There are no Patient Instructions on file for this visit.        Patient Instructions   Metformin     Jardiance AM     Pioglitazone AM     Trulicity ( not an insulin  )  weekly   or  insulin Lantus 15 units at night     Blood glucose goals    Fasting or before meals less than 120,  2 hours after meals or at bedtime less than 180.If the bedtime sugars are less than 100 ,eat a 15 gm snack.    Low blood glucose is less than 70, if you are using continuous glucose monitor please crosscheck with fingerstick as continuous glucose monitor is not accurate when glucose is less than 70.    4 glucose tablets or half a cup of juice for sugars <70. Check blood sugar 15 minutes later, may repeat juice or tablets in 15 minutes.     Glucagon use by family member for severe hypoglycemia (unconciousness)              Return in about 2 months (around 8/14/2024).       Thank you for allowing me to participate in the care of this patient.      Le Bahena MD

## 2024-06-15 LAB
ANION GAP SERPL CALC-SCNC: 8 MMOL/L (ref 5–15)
BUN SERPL-MCNC: 11 MG/DL (ref 6–20)
BUN/CREAT SERPL: 11 (ref 12–20)
CALCIUM SERPL-MCNC: 10.1 MG/DL (ref 8.5–10.1)
CHLORIDE SERPL-SCNC: 104 MMOL/L (ref 97–108)
CO2 SERPL-SCNC: 24 MMOL/L (ref 21–32)
CREAT SERPL-MCNC: 0.99 MG/DL (ref 0.7–1.3)
EST. AVERAGE GLUCOSE BLD GHB EST-MCNC: 266 MG/DL
GLUCOSE SERPL-MCNC: 169 MG/DL (ref 65–100)
HBA1C MFR BLD: 10.9 % (ref 4–5.6)
POTASSIUM SERPL-SCNC: 3.8 MMOL/L (ref 3.5–5.1)
SODIUM SERPL-SCNC: 136 MMOL/L (ref 136–145)

## 2024-07-29 DIAGNOSIS — E11.65 TYPE 2 DIABETES MELLITUS WITH HYPERGLYCEMIA, WITHOUT LONG-TERM CURRENT USE OF INSULIN (HCC): ICD-10-CM

## 2024-08-30 DIAGNOSIS — E11.65 TYPE 2 DIABETES MELLITUS WITH HYPERGLYCEMIA, WITHOUT LONG-TERM CURRENT USE OF INSULIN (HCC): ICD-10-CM

## 2024-08-30 RX ORDER — DULAGLUTIDE 1.5 MG/.5ML
1.5 INJECTION, SOLUTION SUBCUTANEOUS
Qty: 6 ML | Refills: 2 | Status: SHIPPED | OUTPATIENT
Start: 2024-08-30

## 2024-09-03 ENCOUNTER — OFFICE VISIT (OUTPATIENT)
Age: 64
End: 2024-09-03
Payer: COMMERCIAL

## 2024-09-03 VITALS
DIASTOLIC BLOOD PRESSURE: 68 MMHG | OXYGEN SATURATION: 98 % | SYSTOLIC BLOOD PRESSURE: 118 MMHG | TEMPERATURE: 97.7 F | HEART RATE: 65 BPM | HEIGHT: 69 IN | BODY MASS INDEX: 34.07 KG/M2 | RESPIRATION RATE: 16 BRPM | WEIGHT: 230 LBS

## 2024-09-03 DIAGNOSIS — E11.65 TYPE 2 DIABETES MELLITUS WITH HYPERGLYCEMIA, WITHOUT LONG-TERM CURRENT USE OF INSULIN (HCC): ICD-10-CM

## 2024-09-03 DIAGNOSIS — E78.2 MIXED HYPERLIPIDEMIA: Primary | ICD-10-CM

## 2024-09-03 DIAGNOSIS — I10 ESSENTIAL (PRIMARY) HYPERTENSION: ICD-10-CM

## 2024-09-03 PROCEDURE — 3074F SYST BP LT 130 MM HG: CPT | Performed by: INTERNAL MEDICINE

## 2024-09-03 PROCEDURE — 3046F HEMOGLOBIN A1C LEVEL >9.0%: CPT | Performed by: INTERNAL MEDICINE

## 2024-09-03 PROCEDURE — 95251 CONT GLUC MNTR ANALYSIS I&R: CPT | Performed by: INTERNAL MEDICINE

## 2024-09-03 PROCEDURE — 1123F ACP DISCUSS/DSCN MKR DOCD: CPT | Performed by: INTERNAL MEDICINE

## 2024-09-03 PROCEDURE — 99214 OFFICE O/P EST MOD 30 MIN: CPT | Performed by: INTERNAL MEDICINE

## 2024-09-03 PROCEDURE — 3078F DIAST BP <80 MM HG: CPT | Performed by: INTERNAL MEDICINE

## 2024-09-03 RX ORDER — ELECTROLYTES/DEXTROSE
SOLUTION, ORAL ORAL
COMMUNITY

## 2024-09-03 RX ORDER — PEN NEEDLE, DIABETIC 30 GX3/16"
NEEDLE, DISPOSABLE MISCELLANEOUS
Qty: 100 EACH | Refills: 3 | Status: SHIPPED | OUTPATIENT
Start: 2024-09-03

## 2024-09-03 RX ORDER — DULAGLUTIDE 3 MG/.5ML
INJECTION, SOLUTION SUBCUTANEOUS
Qty: 6 ML | Refills: 2 | Status: SHIPPED | OUTPATIENT
Start: 2024-09-03

## 2024-09-03 RX ORDER — ATORVASTATIN CALCIUM 40 MG/1
40 TABLET, FILM COATED ORAL DAILY
COMMUNITY

## 2024-09-03 NOTE — PATIENT INSTRUCTIONS
Truliciity 3 mg weekly     If you cannot get Trulicity then start Insulin lantus 20 units at night

## 2024-10-07 DIAGNOSIS — E11.65 TYPE 2 DIABETES MELLITUS WITH HYPERGLYCEMIA, WITHOUT LONG-TERM CURRENT USE OF INSULIN (HCC): ICD-10-CM

## 2024-10-07 RX ORDER — PIOGLITAZONEHYDROCHLORIDE 30 MG/1
30 TABLET ORAL DAILY
Qty: 30 TABLET | Refills: 6 | Status: SHIPPED | OUTPATIENT
Start: 2024-10-07

## 2024-10-14 DIAGNOSIS — E11.65 TYPE 2 DIABETES MELLITUS WITH HYPERGLYCEMIA, WITHOUT LONG-TERM CURRENT USE OF INSULIN (HCC): ICD-10-CM

## 2024-11-08 PROBLEM — L03.211 CELLULITIS OF FACE: Status: ACTIVE | Noted: 2024-05-29

## 2024-11-08 PROBLEM — E29.1 MALE HYPOGONADISM: Status: ACTIVE | Noted: 2024-11-08

## 2025-05-09 DIAGNOSIS — E11.65 TYPE 2 DIABETES MELLITUS WITH HYPERGLYCEMIA, WITHOUT LONG-TERM CURRENT USE OF INSULIN (HCC): ICD-10-CM

## 2025-05-16 DIAGNOSIS — E11.65 TYPE 2 DIABETES MELLITUS WITH HYPERGLYCEMIA, WITHOUT LONG-TERM CURRENT USE OF INSULIN (HCC): ICD-10-CM

## 2025-05-16 RX ORDER — PIOGLITAZONE 30 MG/1
30 TABLET ORAL DAILY
Qty: 30 TABLET | Refills: 1 | Status: SHIPPED | OUTPATIENT
Start: 2025-05-16

## 2025-06-28 DIAGNOSIS — E11.65 TYPE 2 DIABETES MELLITUS WITH HYPERGLYCEMIA, WITHOUT LONG-TERM CURRENT USE OF INSULIN (HCC): ICD-10-CM

## 2025-08-11 DIAGNOSIS — E11.65 TYPE 2 DIABETES MELLITUS WITH HYPERGLYCEMIA, WITHOUT LONG-TERM CURRENT USE OF INSULIN (HCC): ICD-10-CM

## 2025-08-12 RX ORDER — PIOGLITAZONE 30 MG/1
TABLET ORAL
Qty: 30 TABLET | Refills: 0 | Status: SHIPPED | OUTPATIENT
Start: 2025-08-12